# Patient Record
Sex: MALE | Race: WHITE
[De-identification: names, ages, dates, MRNs, and addresses within clinical notes are randomized per-mention and may not be internally consistent; named-entity substitution may affect disease eponyms.]

---

## 2019-02-12 ENCOUNTER — HOSPITAL ENCOUNTER (OUTPATIENT)
Dept: HOSPITAL 82 - ED | Age: 84
Setting detail: OBSERVATION
LOS: 3 days | Discharge: HOSPICE | End: 2019-02-15
Attending: INTERNAL MEDICINE | Admitting: INTERNAL MEDICINE
Payer: MEDICARE

## 2019-02-12 VITALS — WEIGHT: 169.76 LBS | BODY MASS INDEX: 28.28 KG/M2 | HEIGHT: 65 IN

## 2019-02-12 VITALS — DIASTOLIC BLOOD PRESSURE: 66 MMHG | SYSTOLIC BLOOD PRESSURE: 169 MMHG

## 2019-02-12 DIAGNOSIS — E11.649: Primary | ICD-10-CM

## 2019-02-12 DIAGNOSIS — G93.41: ICD-10-CM

## 2019-02-12 DIAGNOSIS — S01.81XA: ICD-10-CM
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
## 2019-02-12 DIAGNOSIS — F03.91: ICD-10-CM

## 2019-02-12 DIAGNOSIS — E46: ICD-10-CM

## 2019-02-12 DIAGNOSIS — R62.7: ICD-10-CM

## 2019-02-12 DIAGNOSIS — R33.8: ICD-10-CM

## 2019-02-12 DIAGNOSIS — N40.1: ICD-10-CM

## 2019-02-12 DIAGNOSIS — S37.39XA: ICD-10-CM

## 2019-02-12 DIAGNOSIS — Z51.5: ICD-10-CM

## 2019-02-12 DIAGNOSIS — R31.0: ICD-10-CM
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
## 2019-02-12 DIAGNOSIS — I10: ICD-10-CM
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
## 2019-02-12 LAB
ANION GAP SERPL CALCULATED.3IONS-SCNC: 15 MMOL/L
BASOPHILS NFR BLD AUTO: 0 % (ref 0–3)
BUN SERPL-MCNC: 29 MG/DL (ref 8–23)
BUN/CREAT SERPL: 21
CHLORIDE SERPL-SCNC: 108 MMOL/L (ref 95–108)
CO2 SERPL-SCNC: 24 MMOL/L (ref 22–30)
CREAT SERPL-MCNC: 1.3 MG/DL (ref 0.7–1.3)
EOSINOPHIL NFR BLD AUTO: 1 % (ref 0–8)
ERYTHROCYTE [DISTWIDTH] IN BLOOD BY AUTOMATED COUNT: 14.6 % (ref 11.5–15.5)
HCT VFR BLD AUTO: 37.7 % (ref 39–50)
HGB BLD-MCNC: 12.2 G/DL (ref 14–18)
IMM GRANULOCYTES NFR BLD: 0.4 % (ref 0–5)
INR PPP: 1 RATIO (ref 0.7–1.3)
LYMPHOCYTES NFR BLD: 5 % (ref 15–41)
MCH RBC QN AUTO: 30.7 PG  CALC (ref 26–32)
MCHC RBC AUTO-ENTMCNC: 32.4 G/L CALC (ref 32–36)
MCV RBC AUTO: 94.7 FL  CALC (ref 80–100)
MONOCYTES NFR BLD AUTO: 8 % (ref 2–13)
NEUTROPHILS # BLD AUTO: 9.72 THOU/UL (ref 1.82–7.42)
NEUTROPHILS NFR BLD AUTO: 86 % (ref 42–76)
PLATELET # BLD AUTO: 159 THOU/UL (ref 130–400)
POTASSIUM SERPL-SCNC: 3.7 MMOL/L (ref 3.5–5.1)
PROTHROMBIN TIME: 10 SECONDS (ref 9–12.5)
RBC # BLD AUTO: 3.98 MILL/UL (ref 4.7–6.1)
SODIUM SERPL-SCNC: 144 MMOL/L (ref 137–146)

## 2019-02-12 PROCEDURE — G0378 HOSPITAL OBSERVATION PER HR: HCPCS

## 2019-02-12 PROCEDURE — 0HQ1XZZ REPAIR FACE SKIN, EXTERNAL APPROACH: ICD-10-PCS | Performed by: FAMILY MEDICINE

## 2019-02-12 NOTE — NUR
PT. ARRIVED TO THE FLOOR VIA STRETCHER ACCOMPANIED BY ER NURSE; PT. ASSISTED
WITH AMBULATION TO BED WITH 2 STAFF ASSIT; IV SITE PATENT AND ORDERED IVF
INFUSING TO LHAND IV SITE WITH NO REDNESS OR SWELLING NOTED TO SITE; EDUCATED
ON CALL LIGHT USE AND BED ALARM SET FOR SAFETY; CNA IN AT BEDSIDE OBTAINING
VS; CALL LIGHT IS IN REACH. WILL CONTINUE TO MONITOR.

## 2019-02-13 VITALS — SYSTOLIC BLOOD PRESSURE: 145 MMHG | DIASTOLIC BLOOD PRESSURE: 65 MMHG

## 2019-02-13 VITALS — SYSTOLIC BLOOD PRESSURE: 140 MMHG | DIASTOLIC BLOOD PRESSURE: 87 MMHG

## 2019-02-13 VITALS — SYSTOLIC BLOOD PRESSURE: 144 MMHG | DIASTOLIC BLOOD PRESSURE: 61 MMHG

## 2019-02-13 VITALS — DIASTOLIC BLOOD PRESSURE: 60 MMHG | SYSTOLIC BLOOD PRESSURE: 142 MMHG

## 2019-02-13 VITALS — DIASTOLIC BLOOD PRESSURE: 69 MMHG | SYSTOLIC BLOOD PRESSURE: 158 MMHG

## 2019-02-13 VITALS — SYSTOLIC BLOOD PRESSURE: 130 MMHG | DIASTOLIC BLOOD PRESSURE: 63 MMHG

## 2019-02-13 VITALS — DIASTOLIC BLOOD PRESSURE: 62 MMHG | SYSTOLIC BLOOD PRESSURE: 150 MMHG

## 2019-02-13 LAB
BILIRUB UR QL STRIP.AUTO: NEGATIVE
COLOR UR AUTO: YELLOW
GLUCOSE UR STRIP.AUTO-MCNC: 250 MG/DL
HGB UR QL STRIP.AUTO: NEGATIVE
KETONES UR STRIP.AUTO-MCNC: NEGATIVE MG/DL
LEUKOCYTE ESTERASE UR QL STRIP.AUTO: NEGATIVE
NITRITE UR QL STRIP.AUTO: NEGATIVE
PH UR STRIP.AUTO: 6 [PH] (ref 4.5–8)
PROT UR QL STRIP.AUTO: (no result) MG/DL
SP GR UR STRIP.AUTO: 1.02
UROBILINOGEN UR QL STRIP.AUTO: 0.2 E.U./DL

## 2019-02-13 PROCEDURE — 0T9B70Z DRAINAGE OF BLADDER WITH DRAINAGE DEVICE, VIA NATURAL OR ARTIFICIAL OPENING: ICD-10-PCS | Performed by: INTERNAL MEDICINE

## 2019-02-13 NOTE — NUR
REPORT RECEIVED FROM ARCHANA LITTLEJOHN RN. DURING REPORT [facility] 0.189 CALLED TO ARCHANA OSBORN. ARCHANA OSBORN NOTIFIED DR. HERNÁNDEZ. EKG
OBTAINED FOR PHYSICIANS REVIEW. VSS. PT CONFUSED. ANSWERS QUESTIONS
INAPPROPRAITELY. DISORIENTED TO PERSON, PLACE AND TIME. DENIES PAIN. REPORTING
OF CONCERNS ENCOURAGED. CALL LIGHT REVIEWED AND IN REACH. FALL PRECAUTIONS
REINFORCED. BED ALARM SET FOR SAFETY.

## 2019-02-13 NOTE — NUR
PT. SITTING UP IN BED WITH NO RESP. DISTRESS NOTED; ASSESSMENT COMPLETED; IV
SITE PATENT AND SL; CATHETER INTACT AND DRAINING YELLOW URINE AT GRAVITY;
SAFETY REVIEWED WITH PT. BED ALARM ON; PLACED TELEMETRY ON PT'S BACK D/T HIM
REMOVING IT REPEATEDLY FROM THE FRONT; CALL LIGHT IS IN REACH; WILL CONTINUE
TO MONITOR.

## 2019-02-13 NOTE — NUR
SPOKE WITH DR. RONQUILLO AND NOTIFIED HIM OF CRITICAL [facility] AS WELL AS
PREVIOUS ONES THIS ADMISSION; ALSO NOTIFIED HIM THAT PT. IS VERY CONFUSED AND
WILL NOT STAY IN BED AND IS CONTINUOUSLY TAKING OFF TELEMETRY; NEW ORDERS
RECEIVED FOR ONE TIME DOSE OF XANAX; WILL CARRY  THIS OUT.

## 2019-02-13 NOTE — NUR
PT. HAS YET TO VOID AND IS NOT INCONTINENT AT THIS TIME; BLADDER SCANNED AT
THIS TIME AND IS SHOWING 468MLS IN BLADDER; PLACED URINAL IN BETWEEN LEGS AND
WILL ALLOW PT. SOME TIME TO URINATE; WILL CHECK SHORTLY.

## 2019-02-13 NOTE — NUR
SPOKE WITH DR. RONQUILLO AND NOTIFIED HIM OF BS THROUGH OUT THE NIGHT; NEW ORDERS
RECEIVED TO D/C IVF AND CHANGE FREQUENCY OF ACCUCHECKS TO ACHS; ALSO NOTIFIED
HIM EARLIER OF DIFFICULTY OF INSERTION OF CATHETER EARLIER WHEN PT. HAD
RETENTION; ORDER RECEIVED TO LEAVE CAVAZOS CATHTER IN AT THIS TIME;

## 2019-02-13 NOTE — NUR
NOTIFIED DR. RONQUILLO THAT XANAX GIVEN HAS STILL HAD NO EFFECT ON PT. AND THAT
HE CONTINUES TO PULL ON CAVAZOS CATHETER AND IS BLEEDING AT PENIS AND HAS SOME
BLOOD NOTED TO TUBING D/T THE TRAUMA PT. CAUSED TO HIMSELF; ALSO NOTIFIED HIM
OF PT.  CONTINUING TO ATTEMPT TO GET OOB; PT. IS VERY CONFUSED; ATTEMPTED TO
RE-ORIENT NUMEROUS TIMES AND ALL PT. DOES IS LAUGH; PT. IS NOT ABLE TO
COMPREHEND OR RECALL AND RE-DIRECTION GIVEN; NEW ORDERS FOR ONE TIME DOSE OF
SEROQUEL AND AN ORDER FOR REPEAT EKG AT MIDNIGHT; WILL CARRY THESE ORDERS OUT;

## 2019-02-13 NOTE — NUR
PACO CASTRO, IN AT BEDSIDE SITTING WITH PT. FOR SAFETY PRECAUTIONS AT THIS
TIME; D/T PT. CONTINUOSLY PULLING AT CATHETER AND MULTIPLE ATTEMPTS TO GET
OOB; MEDICATED WITH ORDERED XANAX; BED ALARM ON; WILL CONTINUE TO MONITOR.

## 2019-02-13 NOTE — NUR
DUE TO LAST BLADDER SCAN SHOWING RETENTION  MLS TO BLADDER; ORDERS
RECEIVED TO PLACE CATHETER; ; PT. HAD A TIGHT FORESKIN AND WAS THEREFOR
DIFFICULT TO PLACE; 16 Amharic CAVAZOS CATHETER PLACED BY ARCHANA CAMARA;

## 2019-02-14 VITALS — DIASTOLIC BLOOD PRESSURE: 75 MMHG | SYSTOLIC BLOOD PRESSURE: 164 MMHG

## 2019-02-14 VITALS — SYSTOLIC BLOOD PRESSURE: 140 MMHG | DIASTOLIC BLOOD PRESSURE: 42 MMHG

## 2019-02-14 VITALS — SYSTOLIC BLOOD PRESSURE: 139 MMHG | DIASTOLIC BLOOD PRESSURE: 58 MMHG

## 2019-02-14 VITALS — SYSTOLIC BLOOD PRESSURE: 144 MMHG | DIASTOLIC BLOOD PRESSURE: 66 MMHG

## 2019-02-14 VITALS — DIASTOLIC BLOOD PRESSURE: 68 MMHG | SYSTOLIC BLOOD PRESSURE: 153 MMHG

## 2019-02-14 LAB
ALBUMIN SERPL-MCNC: 3.1 G/DL (ref 3.2–5)
ALP SERPL-CCNC: 84 U/L (ref 38–126)
AMYLASE SERPL-CCNC: < 30 U/L (ref 30–110)
ANION GAP SERPL CALCULATED.3IONS-SCNC: 13 MMOL/L
AST SERPL-CCNC: 32 U/L (ref 19–48)
BASOPHILS NFR BLD AUTO: 1 % (ref 0–3)
BUN SERPL-MCNC: 30 MG/DL (ref 8–23)
BUN/CREAT SERPL: 23
CHLORIDE SERPL-SCNC: 104 MMOL/L (ref 95–108)
CO2 SERPL-SCNC: 27 MMOL/L (ref 22–30)
CREAT SERPL-MCNC: 1.3 MG/DL (ref 0.7–1.3)
EOSINOPHIL NFR BLD AUTO: 6 % (ref 0–8)
ERYTHROCYTE [DISTWIDTH] IN BLOOD BY AUTOMATED COUNT: 14.2 % (ref 11.5–15.5)
HCT VFR BLD AUTO: 36.8 % (ref 39–50)
HGB BLD-MCNC: 11.9 G/DL (ref 14–18)
IMM GRANULOCYTES NFR BLD: 0.4 % (ref 0–5)
LIPASE SERPL-CCNC: 39 U/L (ref 23–300)
LYMPHOCYTES NFR BLD: 17 % (ref 15–41)
MAGNESIUM SERPL-MCNC: 1.8 MG/DL (ref 1.6–2.3)
MCH RBC QN AUTO: 30.5 PG  CALC (ref 26–32)
MCHC RBC AUTO-ENTMCNC: 32.3 G/L CALC (ref 32–36)
MCV RBC AUTO: 94.4 FL  CALC (ref 80–100)
MONOCYTES NFR BLD AUTO: 11 % (ref 2–13)
NEUTROPHILS # BLD AUTO: 6.07 THOU/UL (ref 1.82–7.42)
NEUTROPHILS NFR BLD AUTO: 65 % (ref 42–76)
PLATELET # BLD AUTO: 143 THOU/UL (ref 130–400)
POTASSIUM SERPL-SCNC: 3.5 MMOL/L (ref 3.5–5.1)
PROT SERPL-MCNC: 5.6 G/DL (ref 6.3–8.2)
RBC # BLD AUTO: 3.9 MILL/UL (ref 4.7–6.1)
SODIUM SERPL-SCNC: 140 MMOL/L (ref 137–146)

## 2019-02-14 NOTE — NUR
PT SLEEPING, STILL CONTINUES TO FIDGET W/CAVAZOS CATHETER ATTEMPTING TO
DISLODGE. APPEARS AGITATED AT TIMES. PT REPOSITIONED AND ATTEMPTS TO COMFORT
AND REORIENT AT TIMES OF AGITATION. PT NOT OPENING EYES AT THESE TIMES/NOT
INTERACTING AT ALL. CAVAZOS CATHETER APPEARS PATENT DRAINING BLOODY URINE
W/SEDIMENT. MOUTH CARE PROVIDED. PT RESTING IN BED W/EYES CLOSED, BED ALARM ON
AND WRITER SITTING AT BEDSIDE.

## 2019-02-14 NOTE — NUR
PT IS LESS RESTLESS BUT HAVE TO CONTINUE TO REINFORCE THAT HE CAN'T BE TUGGING
ON CAVAZOS. CAVAZOS IS PATENT WITH BLOODY URINE AND NO BLOOD CLOTS NOTED AT THIS
TIME. BED ALARM IN PLACE.

## 2019-02-14 NOTE — NUR
PT. LYING DOWN IN BED WITH NO RESP DISTRESS NOTED; PT. WITH EYES CLOSED BUT
INTERMITTENTLY PULLING AT GOWN AND BLANKET; GOWN IS PARTIALLY REMOVED AT THIS
TIME AND REAPPLIED; CATHETER IS INTACT WITH URINE APPEARING TO TUBING WITH
SLIGHT BLOOD NOTED;NO CLOTS NOTED; BED ALARM ON AND CALL LIGHT IS IN REACH;
WILL CONTINUE TO MONITOR.

## 2019-02-14 NOTE — NUR
SITER IN ROOM. TOLD DR. HERNÁNDEZ THAT I DID THE SECOND BLADDER IRRIGATION. WITH
NO RETURN OF NS FLUID. PT HAS ONLY 20ML OF BLOODY URINE IN CAVAZOS. MD STATED TO
DO A BLADDER SCAN. NOTIFIED MD THAT PT HAS 717ML-190ML IRRIGATION THUS PT HAS
527ML URINE. NO NEW ORDERD RECEIVED AT THIS TIME. ONLY STATED WAITNG FOR
HOSPICE TO COME.

## 2019-02-14 NOTE — NUR
WHEN ENTERING ROOM TO CHECK ON PT. HE IS NOW RESTING WITH EYES CLOSED; WILL
HOLD OFF ON ADMINISTRATION OF XANAX AT THIS TIME; AND STAFF IS RELEASED FROM
ROOM NOW THAT PT. APPEARS TO BE SLEEPING; PULL BED ALARM ATTACHED TO GOWN AND
BED ALARM TO BED SET; WILL CONTINUE TO MONITOR PT. OFTEN FOR SAFETY
PRECAUTIONS;

## 2019-02-14 NOTE — NUR
PT. NOW FULLY AWAKE; BED ALARM GOING OFF ON BED AND PULL ARM; PT. FOUND
UNDRESSING IV SITE AND TUGGING ON CAVAZOS CATHETER AGAIN; MEDICATED WITH ONE
TIME DOSE OF XANAX AT THIS TIME. PT. PULLED UP IN BED AND REPOSITIONED; VS
ASSESSED; SNACK GIVEN AND ORAL FLUIDS OFFERED; CALL LIGHT IS IN REACH; WILL
CONTINUE TO MONITOR.

## 2019-02-14 NOTE — NUR
ARCHANA CADET INSERTED A #20 CAVAZOS  700ML-190ML  IRRIGATION THUS 510 ML BLOODY URINE
WAS THE OUTPUT. BED ALARM IN PLACE.

## 2019-02-14 NOTE — NUR
I CALLED TO VERIFY THE CONSULTATION @0800 AND I SPOKE WITH KAYLEE FROM DR. SHANE OFFICE. SHE VERIFIED THE CONSULTATION AND HE CALLED BACK @802.
#[phone]

## 2019-02-14 NOTE — NUR
PATIENT IS RESTLESS AND TUGGING WITH CAVAZOS. TRYING TO REMOVED GOWN AND
BLANKET. SITTER IN ROOM TO REINFORCED PT TO NOT TUG ON CAVAZOS. BED ALARM IN
PLACE.

## 2019-02-14 NOTE — NUR
DR. SHANE CALLED. NOTIFIED THAT CAVAZOS PATENT BUT  BLOODY URINE NOTED. NO
ORDERS RECEIVED AT THIS TIME.

## 2019-02-14 NOTE — NUR
REPORT RECEIVED FROM JULISSA. CNA IN ROOM AT SIDE AND BED ALARM IN PLACE. CAVAZOS
IS PATENT WITH BLOODY URINE.

## 2019-02-14 NOTE — NUR
NOTIFIED DR. RONQUILLO OF PT. CONTINUING TO PULL AT CATHETER AND HAS MORE
BLEEDING NOTED TO CAVAZOS CATHETER INSERTION SITE AS WELL AS IN TUBING R/T
TRAUMA FROM PT. PULLING; ALSO THAT PT. IS STILL RESTLESS AND CONTINUING TO
ATTEMPT TO GET OOB; NEW ORDERS RECEIVED FOR ONE TIME XANAX AND UROLOGY CONSULT
IN AM; WILL CARRY OUT THESE ORDERS;

## 2019-02-14 NOTE — NUR
BEEN IN PT'S ROOM NUMEROUS TIMES; HAD CNA VANESSA, SIT WITH PT. FROM [id number];
PT. CONTINUOUSLY TUGS AT CATHETER AND THROWING HIS LEGS OVER THE BED;
RE-ORIENTED NUMEROUS TIMES AND UNSUCCESSFUL EVERY TIME; BED ALARM IN PLACE;
WILL CONTINUE TO MONITOR FREQUENTLY; CALL LIGHT IS IN REACH.

## 2019-02-14 NOTE — NUR
DR. HERNÁNDEZ AT BEDSIDE TO ASSESS THE CAVAZOS WITH BLOODY URINE. MD STATED WILL
SPEAK TO DR. SHANE AGAIN. TOLD DR. HERNÁNDEZ THAT PT IS RESTLESS AND TUGS ON
CAVAZOS. ASSESSMENT DONE. TELE IN PLACE. REPS EVEN AND UNLABORED.  PT ALERT TO
SELF ONLY AND CONFUSE. HAVE TO BE  REINFORCING WITH PT TO STOP TUGGING ON
CAVAZOS. PT KEEPS TAKING OF HIS BLANKET. BED ALARM IN PLACE FOR SAFETY.

## 2019-02-14 NOTE — NUR
ASSESSMENT COMPLETED; PT. WITH EYES CLOSED AT THIS TIME; PIPER MENDEZ SITTING AT
BEDSIDE WITH PT.  FOR SAFETY; BED ALARM ON; CAVAZOS CATHETER INTACT AND DRAINING
BLOODY URINE; NO CLOTS NOTED; WILL CONTINUE TO MONITOR. VSS; CALL LIGHT IS IN
REACH; WILL CONTINUE TO MONITOR.

## 2019-02-15 VITALS — DIASTOLIC BLOOD PRESSURE: 80 MMHG | SYSTOLIC BLOOD PRESSURE: 140 MMHG

## 2019-02-15 VITALS — SYSTOLIC BLOOD PRESSURE: 106 MMHG | DIASTOLIC BLOOD PRESSURE: 49 MMHG

## 2019-02-15 VITALS — SYSTOLIC BLOOD PRESSURE: 131 MMHG | DIASTOLIC BLOOD PRESSURE: 61 MMHG

## 2019-02-15 NOTE — NUR
ORAL CARE PROVIDED TO PT, BUT HE REFUSED TO DRINK ANY PO FLUIDS. REMAINS
ASLEEP, BUT HANDS KEEP MOVING TO CATHETER INSERTION SITE. BED ALARM ON AND
WRITER SITTING W/PT.

## 2019-02-15 NOTE — NUR
PT BECOMING VERY AGITATED, ATTEMPTING TO PULL ON CAVAZOS CATHETER MULTIPLE
TIMES, ATTEMPTING TO GET OUT OF BED. I ASSISTED PT IN REPOSITIONING FOR
COMFORT, REPLACED BLANKETS. PT HAS BEEN CLEANED OF SMALL AMOUNT OF INCONTINENT
BROWN SOFT STOOL AND STACEY/CAVAZOS CATHETER CARE PROVIDED. OFFERED PO FLUIDS AND
FOOD/REFUSED. PT MEDICATED FOR AGITATION AT THIS TIME AS ORDERS PROVIDE. CAVAZOS
CATHETER APPEARS PATENT AT THIS TIME, SMALL AMOUNTS OF PINK BLOODY URINE
DRAINING FROM CATHETER. WILL CONTINUE TO MONITOR CLOSELY. BED ALARM ON AND
WRITER SITTING W/PT.

## 2019-02-15 NOTE — NUR
NOTIFIED DR. RONQUILLO OF LOW POTASSIUM AT 2.5 THIS AM IN LABS; NEW ORDERS
RECEIVED AND TO BE CARRIED OUT.

## 2019-02-15 NOTE — NUR
Discharge instructions given. Patient verbalizes understanding of same.
Discharged in stable condition via [facility] Transport to *Other with
*Other. All belongings sent with pt.

## 2019-02-15 NOTE — NUR
SHIFT CHANGE REPORT, PT SLEEPING, BREATHING UNEVEN BUT NON-ALBORED, NO SIGN
DISCOMFORT, SITTER AT BEDSIDE, CALL BELL IN REACH.

## 2019-02-15 NOTE — NUR
PT'S CAVAZOS CATHETER IRRIGATED AGAIN AT THIS TIME; URINE IS LIGHT PINK; NO
CLOTS NOTED; CAVAZOS TO GBRAVITY; SITTER REMAINS AT BEDSIDE;

## 2019-02-15 NOTE — NUR
PT REPOSITIONED W/PILLOWS, NO S/O DISTRESS NOTED, PT SLEEPING AT THIS TIME.
BECOMES FIDGETY W/HANDS TO CAVAZOS CATHETER MULTIPLE TIMES AND WILL MAKE A
GROANING SOUND, BUT EYES REMAIN CLOSED AND NO VERBAL COMMUNICATION.

## 2019-02-15 NOTE — NUR
PT IS STILL ASLEEP, NO SIGN DISCOMFORT. FAMILY VISITING, HOSPICE REP HERE TO
MEET WITH FAMILY AND DISCUSS PLAN, WILL CONTINUE TO MONITOR.

## 2019-02-15 NOTE — NUR
PT'S CAVAZOS CATHETER IRRIGATED BY ARCHANA GUERIN, WITH 100MLS AS PER ORDER IN 50 MLS
INCREMENTS; INITIAL IRRIGATION RETURNED THE 50MLS ON SECOND 50MLS ONLY 10MLS
RETURNED; RECONNECTED TUBING TO CATHETER AND IS NOW DRAINING INTO TUBING CLEAR
RED DRAINAGE AT GRAVITY LEVEL; WILL MONITOR PT. AND HIS OUTPUT; ARCHANA GUERIN
SITTING AT BEDSIDE AS SITTER FOR TONIGHT; BED ALARM ON; CALL LIGHT IS IN
REACH.

## 2022-10-26 NOTE — NUR
DR. HERNÁNDEZ IN TO SEE PT. PLAN OF CARE UPDATED. no abdominal pain, no bloating, no constipation, no diarrhea, no nausea and no vomiting.